# Patient Record
Sex: FEMALE | URBAN - METROPOLITAN AREA
[De-identification: names, ages, dates, MRNs, and addresses within clinical notes are randomized per-mention and may not be internally consistent; named-entity substitution may affect disease eponyms.]

---

## 2021-12-07 ENCOUNTER — OFFICE VISIT (OUTPATIENT)
Dept: URBAN - METROPOLITAN AREA CLINIC 10 | Facility: CLINIC | Age: 51
End: 2021-12-07
Payer: COMMERCIAL

## 2021-12-07 DIAGNOSIS — H25.13 AGE-RELATED NUCLEAR CATARACT, BILATERAL: Primary | ICD-10-CM

## 2021-12-07 DIAGNOSIS — H52.4 PRESBYOPIA: ICD-10-CM

## 2021-12-07 DIAGNOSIS — H04.123 DRY EYE SYNDROME OF BILATERAL LACRIMAL GLANDS: ICD-10-CM

## 2021-12-07 PROCEDURE — 92134 CPTRZ OPH DX IMG PST SGM RTA: CPT | Performed by: STUDENT IN AN ORGANIZED HEALTH CARE EDUCATION/TRAINING PROGRAM

## 2021-12-07 PROCEDURE — 92004 COMPRE OPH EXAM NEW PT 1/>: CPT | Performed by: STUDENT IN AN ORGANIZED HEALTH CARE EDUCATION/TRAINING PROGRAM

## 2021-12-07 ASSESSMENT — INTRAOCULAR PRESSURE
OD: 12
OS: 12

## 2021-12-07 ASSESSMENT — VISUAL ACUITY
OS: 20/20
OD: 20/20

## 2023-03-21 ENCOUNTER — OFFICE VISIT (OUTPATIENT)
Dept: URBAN - METROPOLITAN AREA CLINIC 10 | Facility: CLINIC | Age: 53
End: 2023-03-21
Payer: COMMERCIAL

## 2023-03-21 DIAGNOSIS — H25.13 AGE-RELATED NUCLEAR CATARACT, BILATERAL: Primary | ICD-10-CM

## 2023-03-21 DIAGNOSIS — H16.143 PUNCTATE KERATITIS, BILATERAL: ICD-10-CM

## 2023-03-21 PROCEDURE — 92014 COMPRE OPH EXAM EST PT 1/>: CPT | Performed by: OPTOMETRIST

## 2023-03-21 PROCEDURE — 92134 CPTRZ OPH DX IMG PST SGM RTA: CPT | Performed by: OPTOMETRIST

## 2023-03-21 ASSESSMENT — INTRAOCULAR PRESSURE
OS: 11
OD: 12

## 2023-03-21 ASSESSMENT — VISUAL ACUITY
OS: 20/20
OD: 20/20

## 2023-03-21 NOTE — IMPRESSION/PLAN
Impression: Punctate keratitis, bilateral: H16.143. Plan: Discussed lid hygiene, warm compresses, lipid based tears, and Omega 3 fish oils. Cont. gtts for allergies (uncertain name from PCP.)

## 2023-03-21 NOTE — IMPRESSION/PLAN
Impression: Age-related nuclear cataract, bilateral: H25.13. Plan: Cataracts account for the patient's complaints. No treatment currently recommended. The patient will monitor vision changes and contact us with any decrease in vision. Pt. will cont. c OTC readers for distance and near.